# Patient Record
Sex: FEMALE | Race: BLACK OR AFRICAN AMERICAN | NOT HISPANIC OR LATINO | Employment: FULL TIME | URBAN - METROPOLITAN AREA
[De-identification: names, ages, dates, MRNs, and addresses within clinical notes are randomized per-mention and may not be internally consistent; named-entity substitution may affect disease eponyms.]

---

## 2024-01-26 ENCOUNTER — LAB (OUTPATIENT)
Dept: LAB | Facility: LAB | Age: 29
End: 2024-01-26
Payer: COMMERCIAL

## 2024-01-26 ENCOUNTER — OFFICE VISIT (OUTPATIENT)
Dept: OBSTETRICS AND GYNECOLOGY | Facility: CLINIC | Age: 29
End: 2024-01-26
Payer: COMMERCIAL

## 2024-01-26 VITALS — WEIGHT: 293 LBS | BODY MASS INDEX: 76.01 KG/M2

## 2024-01-26 DIAGNOSIS — Z30.09 ENCOUNTER FOR COUNSELING REGARDING CONTRACEPTION: Primary | ICD-10-CM

## 2024-01-26 DIAGNOSIS — Z00.00 HEALTHCARE MAINTENANCE: ICD-10-CM

## 2024-01-26 LAB
HBV SURFACE AG SERPL QL IA: NONREACTIVE
HCV AB SER QL: NONREACTIVE
HIV 1+2 AB+HIV1 P24 AG SERPL QL IA: NONREACTIVE
TREPONEMA PALLIDUM IGG+IGM AB [PRESENCE] IN SERUM OR PLASMA BY IMMUNOASSAY: NONREACTIVE

## 2024-01-26 PROCEDURE — 99213 OFFICE O/P EST LOW 20 MIN: CPT | Performed by: STUDENT IN AN ORGANIZED HEALTH CARE EDUCATION/TRAINING PROGRAM

## 2024-01-26 PROCEDURE — 87340 HEPATITIS B SURFACE AG IA: CPT

## 2024-01-26 PROCEDURE — 58301 REMOVE INTRAUTERINE DEVICE: CPT | Performed by: STUDENT IN AN ORGANIZED HEALTH CARE EDUCATION/TRAINING PROGRAM

## 2024-01-26 PROCEDURE — 86803 HEPATITIS C AB TEST: CPT

## 2024-01-26 PROCEDURE — 86780 TREPONEMA PALLIDUM: CPT

## 2024-01-26 PROCEDURE — 87389 HIV-1 AG W/HIV-1&-2 AB AG IA: CPT

## 2024-01-26 PROCEDURE — 1036F TOBACCO NON-USER: CPT | Performed by: STUDENT IN AN ORGANIZED HEALTH CARE EDUCATION/TRAINING PROGRAM

## 2024-01-26 PROCEDURE — 36415 COLL VENOUS BLD VENIPUNCTURE: CPT

## 2024-01-26 PROCEDURE — 88175 CYTOPATH C/V AUTO FLUID REDO: CPT | Mod: TC,GCY | Performed by: STUDENT IN AN ORGANIZED HEALTH CARE EDUCATION/TRAINING PROGRAM

## 2024-01-26 RX ORDER — NORGESTIMATE AND ETHINYL ESTRADIOL 0.25-0.035
1 KIT ORAL DAILY
Qty: 112 TABLET | Refills: 3 | Status: SHIPPED | OUTPATIENT
Start: 2024-01-26 | End: 2025-01-25

## 2024-01-26 NOTE — PROGRESS NOTES
Subjective   Patient ID: Edenilson Adam is a 28 y.o. female who presents for IUD removal (Pt here for IUD removal, pt not interested in other birth control at this time/LMP 1/16/2024/No pain or falls reported/Chaperone accepted).    Desires IUD removal. Concerned about weight gain associated with IUD, would like to try alternative method and see how she does.     Reviewed all contraceptive options - desires continuous OCPs.    Objective   Physical Exam  Vitals reviewed. Exam conducted with a chaperone present.   Constitutional:       Appearance: Normal appearance.   HENT:      Head: Normocephalic.   Cardiovascular:      Rate and Rhythm: Normal rate and regular rhythm.   Pulmonary:      Effort: Pulmonary effort is normal. No respiratory distress.   Abdominal:      General: There is no distension.      Palpations: Abdomen is soft. There is no mass.      Tenderness: There is no abdominal tenderness. There is no guarding or rebound.   Genitourinary:     Comments: Normal appearing external female genitalia. Vulva without lesions. Vaginal mucosa normal appearing with physiologic discharge and no lesions. Cervix normal appearing without lesions.     No cervical motion tenderness. Uterus normal sized, mobile. Adnexa without masses or tenderness bilaterally.   Skin:     General: Skin is warm and dry.   Neurological:      General: No focal deficit present.      Mental Status: She is alert.   Psychiatric:         Mood and Affect: Mood normal.         Behavior: Behavior normal.         Thought Content: Thought content normal.         Judgment: Judgment normal.     IUD Removal    Date/Time: 1/26/2024 12:13 PM    Performed by: Indio Vasquez MD  Authorized by: Indio Vaqsuez MD    Comments:      IUD Removal Procedure Note    The patient's desire for IUD removal was confirmed and verbal consent for removal was obtained. A speculum was placed and the cervix and IUD strings were visualized. The strings were grasped  with sterile uterine forceps and gentle traction was applied. The entire IUD was removed without difficulty and excellent hemostasis was noted. The patient tolerated the procedure well. She was given the routine post-IUD removal precautions and verbalized understanding.          Assessment/Plan   Problem List Items Addressed This Visit             ICD-10-CM    Healthcare maintenance Z00.00     Pap due - collected today  STI testing requested - all ordered         Relevant Orders    HIV 1/2 Antigen/Antibody Screen with Reflex to Confirmation    Hepatitis B Surface Antigen    Hepatitis C Antibody    Syphilis Screen with Reflex    THINPREP PAP TEST (24-30)    Encounter for counseling regarding contraception - Primary Z30.09     - IUD removed as above    Contraception management  - Discussion held today regarding reproductive life planning and family planning, as well as optimization of health for future pregnancies. Autonomy of patient respected and confidentiality discussed. All currently available methods of contraception discussed, including temporary and permanent methods. Indications, risks, benefits, bleeding patterns, expectations, usage instructions, and efficacy of each reviewed. Informed patient that no hormonal methods of contraception prevent acquisition or transmission of STDs, and that condoms should be used for that purpose if it is relevant to her exposure risk. CDC guidelines for STD testing should be followed for routine testing, in addition to patient driven testing based on concerns, symptoms, and exposures. Ease of testing reviewed, and encouraged self-awareness of patient's own risks.   - Reproductive life-planning: Folic acid supplementation preconception, pregnancy spacing, medical condition optimization, medication and immunization review prior to any planned pregnancies, and preconception consult for certain high risk medical conditions all encouraged.  - Based on her current history, and  based on [facility] guidelines, she is a candidate for all available methods except:  transdermal patch  - Patient chooses to use:  continuous OCPs  - Reviewed instructions for initiation and continuation per current [facility] guidelines.  - Need for backup contraception reviewed based on current [facility] and SPR guidelines. Questions answered. Call parameters reviewed.          Relevant Medications    norgestimate-ethinyl estradioL (Ortho-Cyclen) 0.25-35 mg-mcg tablet          Indio Vasquez MD 01/26/24 12:12 PM

## 2024-01-26 NOTE — ASSESSMENT & PLAN NOTE
- IUD removed as above    Contraception management  - Discussion held today regarding reproductive life planning and family planning, as well as optimization of health for future pregnancies. Autonomy of patient respected and confidentiality discussed. All currently available methods of contraception discussed, including temporary and permanent methods. Indications, risks, benefits, bleeding patterns, expectations, usage instructions, and efficacy of each reviewed. Informed patient that no hormonal methods of contraception prevent acquisition or transmission of STDs, and that condoms should be used for that purpose if it is relevant to her exposure risk. CDC guidelines for STD testing should be followed for routine testing, in addition to patient driven testing based on concerns, symptoms, and exposures. Ease of testing reviewed, and encouraged self-awareness of patient's own risks.   - Reproductive life-planning: Folic acid supplementation preconception, pregnancy spacing, medical condition optimization, medication and immunization review prior to any planned pregnancies, and preconception consult for certain high risk medical conditions all encouraged.  - Based on her current history, and based on [facility] guidelines, she is a candidate for all available methods except:  transdermal patch  - Patient chooses to use:  continuous OCPs  - Reviewed instructions for initiation and continuation per current CDC SPR guidelines.  - Need for backup contraception reviewed based on current CDC [facility] and SPR guidelines. Questions answered. Call parameters reviewed.

## 2024-02-08 LAB
CYTOLOGY CMNT CVX/VAG CYTO-IMP: NORMAL
LAB AP HPV GENOTYPE QUESTION: NO
LAB AP HPV HR: NORMAL
LABORATORY COMMENT REPORT: NORMAL
LMP START DATE: NORMAL
PATH REPORT.TOTAL CANCER: NORMAL